# Patient Record
Sex: FEMALE
[De-identification: names, ages, dates, MRNs, and addresses within clinical notes are randomized per-mention and may not be internally consistent; named-entity substitution may affect disease eponyms.]

---

## 2017-06-22 ENCOUNTER — CHARTING TRANS (OUTPATIENT)
Dept: OTHER | Age: 52
End: 2017-06-22

## 2017-08-09 ENCOUNTER — CHARTING TRANS (OUTPATIENT)
Dept: OTHER | Age: 52
End: 2017-08-09

## 2018-01-08 ENCOUNTER — LAB SERVICES (OUTPATIENT)
Dept: OTHER | Age: 53
End: 2018-01-08

## 2018-01-08 ENCOUNTER — CHARTING TRANS (OUTPATIENT)
Dept: OTHER | Age: 53
End: 2018-01-08

## 2018-01-08 LAB
APPEARANCE: YELLOW
BILIRUBIN: NORMAL
GLUCOSE U: NORMAL
KETONES: NORMAL
LEUKOCYTES: NORMAL
NITRITE: NORMAL
OCCULT BLOOD: NORMAL
PH: 6
PROTEIN: NORMAL
URINE SPEC GRAVITY: 1.01
UROBILINOGEN: 0.2

## 2018-01-08 ASSESSMENT — PAIN SCALES - GENERAL: PAINLEVEL_OUTOF10: 9

## 2018-01-15 ENCOUNTER — HOSPITAL ENCOUNTER (OUTPATIENT)
Age: 53
Discharge: HOME | End: 2018-01-15
Payer: MEDICAID

## 2018-01-15 ENCOUNTER — APPOINTMENT (OUTPATIENT)
Dept: GENERAL RADIOLOGY | Age: 53
End: 2018-01-15
Attending: NURSE PRACTITIONER
Payer: MEDICAID

## 2018-01-15 VITALS
RESPIRATION RATE: 16 BRPM | SYSTOLIC BLOOD PRESSURE: 123 MMHG | DIASTOLIC BLOOD PRESSURE: 65 MMHG | HEART RATE: 74 BPM | TEMPERATURE: 99 F | OXYGEN SATURATION: 100 %

## 2018-01-15 DIAGNOSIS — S99.922A INJURY OF TOE ON LEFT FOOT, INITIAL ENCOUNTER: Primary | ICD-10-CM

## 2018-01-15 DIAGNOSIS — L85.3 DRY SKIN DERMATITIS: ICD-10-CM

## 2018-01-15 PROCEDURE — 73660 X-RAY EXAM OF TOE(S): CPT | Performed by: NURSE PRACTITIONER

## 2018-01-15 PROCEDURE — 99204 OFFICE O/P NEW MOD 45 MIN: CPT

## 2018-01-15 PROCEDURE — 99203 OFFICE O/P NEW LOW 30 MIN: CPT

## 2018-01-15 RX ORDER — LAMOTRIGINE 150 MG/1
300 TABLET ORAL 2 TIMES DAILY
COMMUNITY

## 2018-01-15 RX ORDER — TRIAMCINOLONE ACETONIDE 0.25 MG/G
1 CREAM TOPICAL 2 TIMES DAILY
Qty: 15 G | Refills: 0 | Status: SHIPPED | OUTPATIENT
Start: 2018-01-15 | End: 2018-05-02 | Stop reason: ALTCHOICE

## 2018-01-15 RX ORDER — PREGABALIN 50 MG/1
50 CAPSULE ORAL NIGHTLY
COMMUNITY
End: 2019-12-11

## 2018-01-15 RX ORDER — NITROFURANTOIN MACROCRYSTALS 100 MG/1
100 CAPSULE ORAL 2 TIMES DAILY
COMMUNITY
End: 2018-05-02

## 2018-01-15 NOTE — ED PROVIDER NOTES
Patient Seen in: [address] [facility]    History   Patient presents with:  Rash  Toe Injury    Stated Complaint: lump on her neck,left rib lump    68-year-old female presents today with complaints of rash to the face, neck and under the left b Constitutional: She is oriented to person, place, and time. She appears well-developed and well-nourished. HENT:   Head: Normocephalic.    Right Ear: Hearing and tympanic membrane normal.   Left Ear: Hearing and tympanic membrane normal.   Nose: Nose norm X-ray of toe shows no acute fracture. Patient instructed on claudette tape and rice instructions.   Patient given prescription for 2.5% of hydrocodone cream to help with eczema to the neck and left chest.  Patient also states has area over her eye that is dry
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
## 2018-03-10 ENCOUNTER — HOSPITAL ENCOUNTER (OUTPATIENT)
Age: 53
Discharge: HOME | End: 2018-03-10
Attending: FAMILY MEDICINE
Payer: MEDICAID

## 2018-03-10 VITALS
WEIGHT: 150 LBS | TEMPERATURE: 99 F | OXYGEN SATURATION: 100 % | RESPIRATION RATE: 16 BRPM | SYSTOLIC BLOOD PRESSURE: 125 MMHG | DIASTOLIC BLOOD PRESSURE: 63 MMHG | HEART RATE: 81 BPM

## 2018-03-10 DIAGNOSIS — H11.151 PINGUECULA OF RIGHT EYE: Primary | ICD-10-CM

## 2018-03-10 PROCEDURE — 99213 OFFICE O/P EST LOW 20 MIN: CPT

## 2018-03-10 RX ORDER — OLOPATADINE HYDROCHLORIDE 1 MG/ML
1 SOLUTION/ DROPS OPHTHALMIC 2 TIMES DAILY
Qty: 1 BOTTLE | Refills: 0 | Status: SHIPPED | OUTPATIENT
Start: 2018-03-10 | End: 2018-03-17

## 2018-03-10 NOTE — ED INITIAL ASSESSMENT (HPI)
Right eye has a red dot in the middle one week ago, right eye pain, no vision issues, tore a \"Mole,\" on her right upper abd, she just wants to have it looked at today.

## 2018-03-10 NOTE — ED PROVIDER NOTES
Patient Seen in: [address] [facility]    History   Patient presents with:   Eye Visual Problem (opthalmic)    Stated Complaint: right eye issue / tired / possible infection    HPI    43-year-old female presents to the immediate care today with 0902]  BP: 125/63  Pulse: 81  Resp: 16  Temp: 98.6 °F (37 °C)  Temp src: Temporal  SpO2: 100 %  O2 Device: None (Room air)    Current:/63   Pulse 81   Temp 98.6 °F (37 °C) (Temporal)   Resp 16   Wt 68 kg   SpO2 100%     Right Eye Chart Acuity: 20/40, Labs Reviewed - No data to display    ED Course as of Mar 10 0930  ------------------------------------------------------------       MDM     51-year-old female presenting with a pinguecula of the right eye. No signs of pterygium.   Recommend wearing sun

## 2018-05-02 ENCOUNTER — OFFICE VISIT (OUTPATIENT)
Dept: FAMILY MEDICINE CLINIC | Facility: CLINIC | Age: 53
End: 2018-05-02

## 2018-05-02 VITALS
OXYGEN SATURATION: 97 % | WEIGHT: 155 LBS | HEIGHT: 61.5 IN | BODY MASS INDEX: 28.89 KG/M2 | HEART RATE: 86 BPM | DIASTOLIC BLOOD PRESSURE: 72 MMHG | TEMPERATURE: 98 F | SYSTOLIC BLOOD PRESSURE: 98 MMHG | RESPIRATION RATE: 20 BRPM

## 2018-05-02 DIAGNOSIS — D22.9 NUMEROUS MOLES: Primary | ICD-10-CM

## 2018-05-02 DIAGNOSIS — L30.9 DERMATITIS: ICD-10-CM

## 2018-05-02 PROCEDURE — 99203 OFFICE O/P NEW LOW 30 MIN: CPT | Performed by: FAMILY MEDICINE

## 2018-05-02 RX ORDER — PIMECROLIMUS 10 MG/G
CREAM TOPICAL
Qty: 30 G | Refills: 0 | Status: SHIPPED | OUTPATIENT
Start: 2018-05-02 | End: 2018-07-25

## 2018-05-02 NOTE — PROGRESS NOTES
HPI:    Patient ID: Christianne Taylor is a 46year old female. Patient presents with:  Moles  Derm Problem    HPI   Here to estab care and get derm referral for mole check. Also here for rash. Eval in IC. Rash still there. Not spreading.   Rash on left s Numerous moles  (primary encounter diagnosis)  Dermatitis  D/c triamcino. Trial of elidel  Derm referral given  RTC as needed  No orders of the defined types were placed in this encounter.       Meds This Visit:  Signed Prescriptions Disp Refills    Keith

## 2018-07-25 ENCOUNTER — HOSPITAL ENCOUNTER (OUTPATIENT)
Age: 53
Discharge: HOME | End: 2018-07-25
Attending: FAMILY MEDICINE
Payer: MEDICAID

## 2018-07-25 ENCOUNTER — APPOINTMENT (OUTPATIENT)
Dept: ULTRASOUND IMAGING | Age: 53
End: 2018-07-25
Attending: FAMILY MEDICINE
Payer: MEDICAID

## 2018-07-25 ENCOUNTER — TELEPHONE (OUTPATIENT)
Dept: FAMILY MEDICINE CLINIC | Facility: CLINIC | Age: 53
End: 2018-07-25

## 2018-07-25 ENCOUNTER — APPOINTMENT (OUTPATIENT)
Dept: GENERAL RADIOLOGY | Age: 53
End: 2018-07-25
Attending: FAMILY MEDICINE
Payer: MEDICAID

## 2018-07-25 VITALS
DIASTOLIC BLOOD PRESSURE: 65 MMHG | HEIGHT: 62 IN | WEIGHT: 157.81 LBS | BODY MASS INDEX: 29.04 KG/M2 | HEART RATE: 65 BPM | RESPIRATION RATE: 16 BRPM | TEMPERATURE: 99 F | OXYGEN SATURATION: 100 % | SYSTOLIC BLOOD PRESSURE: 114 MMHG

## 2018-07-25 DIAGNOSIS — M25.561 ACUTE PAIN OF RIGHT KNEE: Primary | ICD-10-CM

## 2018-07-25 PROCEDURE — 93971 EXTREMITY STUDY: CPT | Performed by: FAMILY MEDICINE

## 2018-07-25 PROCEDURE — 99214 OFFICE O/P EST MOD 30 MIN: CPT

## 2018-07-25 PROCEDURE — 73560 X-RAY EXAM OF KNEE 1 OR 2: CPT | Performed by: FAMILY MEDICINE

## 2018-07-25 RX ORDER — NAPROXEN 500 MG/1
500 TABLET ORAL 2 TIMES DAILY PRN
Qty: 20 TABLET | Refills: 0 | Status: SHIPPED | OUTPATIENT
Start: 2018-07-25 | End: 2018-08-01

## 2018-07-25 NOTE — TELEPHONE ENCOUNTER
Pt called stated she has been having rt knee pain behind her knee and travels all the way down to the ankle. Pt stated it is swollen has been putting ice. Pt was wondering if she could be seen.

## 2018-07-25 NOTE — TELEPHONE ENCOUNTER
Location of pain: right knee, popliteal area, traveling down to ankle; shooting pain by ankle at night. Pain score:  8/10  Duration of pain:  Has been going on for about a week, thought stretching would work  Redness? No  Swelling?  Yes, lateral side of k

## 2018-07-25 NOTE — ED INITIAL ASSESSMENT (HPI)
x1 wk Pt c/o right knee pain that radiates into right calf/shin, +swelling of knee into ankle. +tingling, denies numbness, Pt denies any injury.     Eriberto knee 17 in  Left calf: 16in  Right calf 16.5in  eriberto ankle 10in

## 2018-07-25 NOTE — ED NOTES
Pt in room after Xray, pain 3/10, informed of wait times for US. Denies any needs at this time, positioned for comfort.

## 2018-07-26 NOTE — ED PROVIDER NOTES
Patient Seen in: [address] [facility]    History   Patient presents with:  Knee Pain  Leg Pain    Stated Complaint: right knee pain    HPI    51-year-old female presents for right knee and right leg pain.   Patient states she has intermittent r noted.   Neurological: She is alert and oriented to person, place, and time. Skin: Skin is warm and dry.      ED Course   Labs Reviewed - No data to display    ED Course as of Jul 25 2230  ------------------------------------------------------------

## 2018-07-31 ENCOUNTER — APPOINTMENT (OUTPATIENT)
Dept: GENERAL RADIOLOGY | Age: 53
End: 2018-07-31
Attending: EMERGENCY MEDICINE
Payer: MEDICAID

## 2018-07-31 ENCOUNTER — HOSPITAL ENCOUNTER (OUTPATIENT)
Age: 53
Discharge: HOME | End: 2018-07-31
Attending: EMERGENCY MEDICINE
Payer: MEDICAID

## 2018-07-31 VITALS
RESPIRATION RATE: 16 BRPM | SYSTOLIC BLOOD PRESSURE: 120 MMHG | OXYGEN SATURATION: 100 % | TEMPERATURE: 100 F | DIASTOLIC BLOOD PRESSURE: 83 MMHG | HEART RATE: 64 BPM

## 2018-07-31 DIAGNOSIS — M25.461 KNEE EFFUSION, RIGHT: ICD-10-CM

## 2018-07-31 DIAGNOSIS — S86.911A KNEE STRAIN, RIGHT, INITIAL ENCOUNTER: Primary | ICD-10-CM

## 2018-07-31 PROCEDURE — 99213 OFFICE O/P EST LOW 20 MIN: CPT

## 2018-07-31 PROCEDURE — 73560 X-RAY EXAM OF KNEE 1 OR 2: CPT | Performed by: EMERGENCY MEDICINE

## 2018-07-31 NOTE — ED PROVIDER NOTES
Patient presents with:  Lower Extremity Injury (musculoskeletal)    HPI:     Sumaya Licona is a 48year old female who presents with chief complaint of R knee pain. Was seen here on 7/25 for R knee and leg pain that had been ongoing for about 3 weeks. steps in her garage 2 days ago. She opened the door and fell backwards. Her right knee twisted sideways and she hit her head on the car. Patient has pain to anterior right knee. States she was unable to fully weight bear until this morning.   Swelling has

## 2018-07-31 NOTE — ED INITIAL ASSESSMENT (HPI)
Pt sts fell down 3 concrete steps in her garage on Sunday. Sts opened the door and fell backwards, right knee twisted sideways and hit head on car. No LOC, denies other injuries.   Aching/tightness pain to right lower thigh, knee, and upper lower leg with

## 2018-08-01 ENCOUNTER — OFFICE VISIT (OUTPATIENT)
Dept: FAMILY MEDICINE CLINIC | Facility: CLINIC | Age: 53
End: 2018-08-01
Payer: MEDICAID

## 2018-08-01 VITALS
TEMPERATURE: 98 F | BODY MASS INDEX: 29.82 KG/M2 | HEART RATE: 78 BPM | DIASTOLIC BLOOD PRESSURE: 60 MMHG | HEIGHT: 61.5 IN | RESPIRATION RATE: 16 BRPM | OXYGEN SATURATION: 98 % | WEIGHT: 160 LBS | SYSTOLIC BLOOD PRESSURE: 102 MMHG

## 2018-08-01 DIAGNOSIS — M25.461 EFFUSION OF RIGHT KNEE: ICD-10-CM

## 2018-08-01 DIAGNOSIS — M25.561 ACUTE PAIN OF RIGHT KNEE: Primary | ICD-10-CM

## 2018-08-01 PROCEDURE — 99214 OFFICE O/P EST MOD 30 MIN: CPT | Performed by: FAMILY MEDICINE

## 2018-08-01 NOTE — PROGRESS NOTES
HPI:    Patient ID: Kaela Brown is a 48year old female. Patient presents with:  Knee Pain    HPI  Here for follow up on right knee pain. Not much improvement. Seen in IC on 7/25 and 7/31  Location: Lateral side  Radiates to lateral side of calf.  Ritika Cadena %. ASSESSMENT/PLAN:   Acute pain of right knee  (primary encounter diagnosis)  Effusion of right knee  Pt requesting MRI knee. May need to do PT first. Both tests ordered. Rec knee brace for additional support. Cold packs.  Cont naproxen prn  Pt

## 2018-08-04 ENCOUNTER — OFFICE VISIT (OUTPATIENT)
Dept: FAMILY MEDICINE CLINIC | Facility: CLINIC | Age: 53
End: 2018-08-04
Payer: MEDICAID

## 2018-08-04 VITALS
SYSTOLIC BLOOD PRESSURE: 100 MMHG | RESPIRATION RATE: 16 BRPM | HEIGHT: 61.5 IN | TEMPERATURE: 98 F | HEART RATE: 89 BPM | OXYGEN SATURATION: 98 % | DIASTOLIC BLOOD PRESSURE: 68 MMHG | BODY MASS INDEX: 30.01 KG/M2 | WEIGHT: 161 LBS

## 2018-08-04 DIAGNOSIS — Z00.00 ANNUAL PHYSICAL EXAM: Primary | ICD-10-CM

## 2018-08-04 DIAGNOSIS — Z23 NEED FOR DIPHTHERIA-TETANUS-PERTUSSIS (TDAP) VACCINE: ICD-10-CM

## 2018-08-04 PROCEDURE — 90715 TDAP VACCINE 7 YRS/> IM: CPT | Performed by: FAMILY MEDICINE

## 2018-08-04 PROCEDURE — 90471 IMMUNIZATION ADMIN: CPT | Performed by: FAMILY MEDICINE

## 2018-08-04 PROCEDURE — 99396 PREV VISIT EST AGE 40-64: CPT | Performed by: FAMILY MEDICINE

## 2018-08-04 NOTE — PROGRESS NOTES
HPI:   Bharti Ayala is a 48year old female who presents for a complete physical exam.   UTD on colonoscopy  -MD in [place]. Will call back with name. Mammogram scheduled at [facility] for 9/8/18. Has gyne at [facility] -UTD on pap per pt.  Sees Dr. Obdulia Mejia denies headaches or dizziness  PSYCHE: denies depression or anxiety    EXAM:   /68   Pulse 89   Temp 98.3 °F (36.8 °C) (Temporal)   Resp 16   Ht 61.5\"   Wt 161 lb   SpO2 98%   BMI 29.93 kg/m²   Body mass index is 29.93 kg/m².    GENERAL:  well nouris

## 2018-08-06 ENCOUNTER — TELEPHONE (OUTPATIENT)
Dept: FAMILY MEDICINE CLINIC | Facility: CLINIC | Age: 53
End: 2018-08-06

## 2018-08-06 NOTE — TELEPHONE ENCOUNTER
Pt called was seen Sat and Dr. Venkat Chao wanted her to call back to let her know when her last pap was. Pt stated she has it 6/16/2017 with Dr. Constance Damian.

## 2018-08-07 ENCOUNTER — TELEPHONE (OUTPATIENT)
Dept: FAMILY MEDICINE CLINIC | Facility: CLINIC | Age: 53
End: 2018-08-07

## 2018-08-07 NOTE — TELEPHONE ENCOUNTER
Pt called stated Dr. Carla Moody wanted her to call and let her know when her last pap and last colonoscopy were.  Last pap was in June of last year by Dr. Mery Morin last colonoscopy was done last year Dr. Chikis Brenner phone number [phone]

## 2018-08-10 ENCOUNTER — NURSE ONLY (OUTPATIENT)
Dept: FAMILY MEDICINE CLINIC | Facility: CLINIC | Age: 53
End: 2018-08-10
Payer: MEDICAID

## 2018-08-10 DIAGNOSIS — Z00.00 GENERAL MEDICAL EXAM: Primary | ICD-10-CM

## 2018-08-10 DIAGNOSIS — Z00.00 ANNUAL PHYSICAL EXAM: ICD-10-CM

## 2018-08-10 LAB
ALBUMIN SERPL-MCNC: 3.7 G/DL (ref 3.5–4.8)
ALBUMIN/GLOB SERPL: 1 {RATIO} (ref 1–2)
ALP LIVER SERPL-CCNC: 65 U/L (ref 41–108)
ALT SERPL-CCNC: 37 U/L (ref 14–54)
ANION GAP SERPL CALC-SCNC: 4 MMOL/L (ref 0–18)
AST SERPL-CCNC: 28 U/L (ref 15–41)
BASOPHILS # BLD AUTO: 0.04 X10(3) UL (ref 0–0.1)
BASOPHILS NFR BLD AUTO: 1.3 %
BILIRUB SERPL-MCNC: 0.5 MG/DL (ref 0.1–2)
BUN BLD-MCNC: 15 MG/DL (ref 8–20)
BUN/CREAT SERPL: 13 (ref 10–20)
CALCIUM BLD-MCNC: 8.9 MG/DL (ref 8.3–10.3)
CHLORIDE SERPL-SCNC: 106 MMOL/L (ref 101–111)
CHOLEST SMN-MCNC: 247 MG/DL (ref ?–200)
CO2 SERPL-SCNC: 30 MMOL/L (ref 22–32)
CREAT BLD-MCNC: 1.15 MG/DL (ref 0.55–1.02)
EOSINOPHIL # BLD AUTO: 0.45 X10(3) UL (ref 0–0.3)
EOSINOPHIL NFR BLD AUTO: 14.3 %
ERYTHROCYTE [DISTWIDTH] IN BLOOD BY AUTOMATED COUNT: 13.2 % (ref 11.5–16)
EST. AVERAGE GLUCOSE BLD GHB EST-MCNC: 105 MG/DL (ref 68–126)
GLOBULIN PLAS-MCNC: 3.6 G/DL (ref 2.5–3.7)
GLUCOSE BLD-MCNC: 85 MG/DL (ref 70–99)
HBA1C MFR BLD HPLC: 5.3 % (ref ?–5.7)
HCT VFR BLD AUTO: 39.7 % (ref 34–50)
HDLC SERPL-MCNC: 94 MG/DL (ref 40–59)
HGB BLD-MCNC: 13.2 G/DL (ref 12–16)
IMMATURE GRANULOCYTE COUNT: 0.01 X10(3) UL (ref 0–1)
IMMATURE GRANULOCYTE RATIO %: 0.3 %
LDLC SERPL CALC-MCNC: 145 MG/DL (ref ?–100)
LYMPHOCYTES # BLD AUTO: 1.09 X10(3) UL (ref 0.9–4)
LYMPHOCYTES NFR BLD AUTO: 34.6 %
M PROTEIN MFR SERPL ELPH: 7.3 G/DL (ref 6.1–8.3)
MCH RBC QN AUTO: 31.9 PG (ref 27–33.2)
MCHC RBC AUTO-ENTMCNC: 33.2 G/DL (ref 31–37)
MCV RBC AUTO: 95.9 FL (ref 81–100)
MONOCYTES # BLD AUTO: 0.32 X10(3) UL (ref 0.1–1)
MONOCYTES NFR BLD AUTO: 10.2 %
NEUTROPHIL ABS PRELIM: 1.24 X10 (3) UL (ref 1.3–6.7)
NEUTROPHILS # BLD AUTO: 1.24 X10(3) UL (ref 1.3–6.7)
NEUTROPHILS NFR BLD AUTO: 39.3 %
NONHDLC SERPL-MCNC: 153 MG/DL (ref ?–130)
OSMOLALITY SERPL CALC.SUM OF ELEC: 290 MOSM/KG (ref 275–295)
PLATELET # BLD AUTO: 276 10(3)UL (ref 150–450)
POTASSIUM SERPL-SCNC: 4.4 MMOL/L (ref 3.6–5.1)
RBC # BLD AUTO: 4.14 X10(6)UL (ref 3.8–5.1)
RED CELL DISTRIBUTION WIDTH-SD: 46.7 FL (ref 35.1–46.3)
SODIUM SERPL-SCNC: 140 MMOL/L (ref 136–144)
TRIGL SERPL-MCNC: 41 MG/DL (ref 30–149)
TSI SER-ACNC: 3.34 MIU/ML (ref 0.35–5.5)
VLDLC SERPL CALC-MCNC: 8 MG/DL (ref 0–30)
WBC # BLD AUTO: 3.2 X10(3) UL (ref 4–13)

## 2018-08-10 PROCEDURE — 84443 ASSAY THYROID STIM HORMONE: CPT | Performed by: FAMILY MEDICINE

## 2018-08-10 PROCEDURE — 80061 LIPID PANEL: CPT | Performed by: FAMILY MEDICINE

## 2018-08-10 PROCEDURE — 83036 HEMOGLOBIN GLYCOSYLATED A1C: CPT | Performed by: FAMILY MEDICINE

## 2018-08-10 PROCEDURE — 80053 COMPREHEN METABOLIC PANEL: CPT | Performed by: FAMILY MEDICINE

## 2018-08-10 PROCEDURE — 85025 COMPLETE CBC W/AUTO DIFF WBC: CPT | Performed by: FAMILY MEDICINE

## 2018-08-10 PROCEDURE — 36415 COLL VENOUS BLD VENIPUNCTURE: CPT | Performed by: FAMILY MEDICINE

## 2018-08-15 ENCOUNTER — OFFICE VISIT (OUTPATIENT)
Dept: OBGYN CLINIC | Facility: CLINIC | Age: 53
End: 2018-08-15
Payer: MEDICAID

## 2018-08-15 VITALS
BODY MASS INDEX: 28.53 KG/M2 | WEIGHT: 161 LBS | HEART RATE: 70 BPM | DIASTOLIC BLOOD PRESSURE: 70 MMHG | SYSTOLIC BLOOD PRESSURE: 108 MMHG | HEIGHT: 63 IN

## 2018-08-15 DIAGNOSIS — R32 URINARY INCONTINENCE, UNSPECIFIED TYPE: ICD-10-CM

## 2018-08-15 DIAGNOSIS — N95.1 MENOPAUSAL VAGINAL DRYNESS: ICD-10-CM

## 2018-08-15 DIAGNOSIS — Z12.31 BREAST CANCER SCREENING BY MAMMOGRAM: ICD-10-CM

## 2018-08-15 DIAGNOSIS — Z87.898 HISTORY OF ABNORMAL MAMMOGRAM: ICD-10-CM

## 2018-08-15 DIAGNOSIS — Z01.419 ENCOUNTER FOR GYNECOLOGICAL EXAMINATION WITHOUT ABNORMAL FINDING: Primary | ICD-10-CM

## 2018-08-15 PROBLEM — E28.319 PREMATURE MENOPAUSE: Status: ACTIVE | Noted: 2018-08-15

## 2018-08-15 PROBLEM — G40.909 SEIZURE DISORDER (HCC): Status: ACTIVE | Noted: 2018-08-15

## 2018-08-15 PROCEDURE — 99203 OFFICE O/P NEW LOW 30 MIN: CPT | Performed by: OBSTETRICS & GYNECOLOGY

## 2018-08-15 RX ORDER — ESTRADIOL 10 UG/1
10 INSERT VAGINAL
Qty: 8 TABLET | Refills: 0 | Status: SHIPPED | OUTPATIENT
Start: 2018-08-16 | End: 2018-09-15

## 2018-08-15 RX ORDER — ESTRADIOL 10 UG/1
INSERT VAGINAL
Qty: 16 TABLET | Refills: 0 | Status: SHIPPED | OUTPATIENT
Start: 2018-08-15 | End: 2018-08-28

## 2018-08-15 NOTE — PROGRESS NOTES
New gynecology visit. 48year old G 2 P 1011. No LMP recorded. Patient is not currently having periods (Reason: Menopause). .     Here for establishment of gyn care and discussion of other issues. Last gyn exam with pap smear one year ago.  Went through me swelling, arthralgias. Neurological:  Epilepsy. /70   Pulse 70   Ht 63\"   Wt 161 lb   BMI 28.52 kg/m²     NECK:  Thyroid normal. No adenopathy. LUNGS:  Clear to auscultation. COR; Regular rate and rhythm.     BREASTS:  Symmetric

## 2018-08-25 ENCOUNTER — HOSPITAL ENCOUNTER (OUTPATIENT)
Dept: MAMMOGRAPHY | Facility: HOSPITAL | Age: 53
Discharge: HOME | End: 2018-08-25
Attending: OBSTETRICS & GYNECOLOGY
Payer: MEDICAID

## 2018-08-25 DIAGNOSIS — Z12.31 BREAST CANCER SCREENING BY MAMMOGRAM: ICD-10-CM

## 2018-08-25 DIAGNOSIS — Z87.898 HISTORY OF ABNORMAL MAMMOGRAM: ICD-10-CM

## 2018-08-25 PROCEDURE — 77063 BREAST TOMOSYNTHESIS BI: CPT | Performed by: OBSTETRICS & GYNECOLOGY

## 2018-08-25 PROCEDURE — 77067 SCR MAMMO BI INCL CAD: CPT | Performed by: OBSTETRICS & GYNECOLOGY

## 2018-08-28 ENCOUNTER — TELEPHONE (OUTPATIENT)
Dept: OBGYN CLINIC | Facility: CLINIC | Age: 53
End: 2018-08-28

## 2018-08-28 ENCOUNTER — OFFICE VISIT (OUTPATIENT)
Dept: OBGYN CLINIC | Facility: CLINIC | Age: 53
End: 2018-08-28
Payer: MEDICAID

## 2018-08-28 VITALS
SYSTOLIC BLOOD PRESSURE: 120 MMHG | BODY MASS INDEX: 23.05 KG/M2 | DIASTOLIC BLOOD PRESSURE: 78 MMHG | HEIGHT: 70 IN | WEIGHT: 161 LBS

## 2018-08-28 DIAGNOSIS — R35.0 URINARY FREQUENCY: Primary | ICD-10-CM

## 2018-08-28 DIAGNOSIS — N90.89 VULVAR LESION: ICD-10-CM

## 2018-08-28 LAB
MULTISTIX LOT#: NORMAL NUMERIC
PH, URINE: 5.5 (ref 4.5–8)
SPECIFIC GRAVITY: 1.01 (ref 1–1.03)
UROBILINOGEN,SEMI-QN: 0.2 MG/DL (ref 0–1.9)

## 2018-08-28 PROCEDURE — 99213 OFFICE O/P EST LOW 20 MIN: CPT | Performed by: NURSE PRACTITIONER

## 2018-08-28 PROCEDURE — 87086 URINE CULTURE/COLONY COUNT: CPT | Performed by: NURSE PRACTITIONER

## 2018-08-28 PROCEDURE — 81002 URINALYSIS NONAUTO W/O SCOPE: CPT | Performed by: NURSE PRACTITIONER

## 2018-08-28 PROCEDURE — 87529 HSV DNA AMP PROBE: CPT | Performed by: NURSE PRACTITIONER

## 2018-08-28 NOTE — TELEPHONE ENCOUNTER
48year old patient complaining of red, raised vaginal sore x one week; pt reports  seeing PCP for same issue.   Last OV date: 8/15/18  Recent Test/Labs: n/a   Recommendations Pt given appt today for exam.

## 2018-08-28 NOTE — PROGRESS NOTES
S:  Patient is here with a 1 week history of 2 painful vulvar bumps. They are tender to touch. They have not changed in character. Last night she also started having extreme urinary frequency. It doesn't burn with urination, she denies any urgency.  She gonzalez

## 2018-08-28 NOTE — TELEPHONE ENCOUNTER
Patient states that she has a rash in vaginal area. Yeast infection? UTI? She has to go to the bathroom frequently.  also has a rash that he is going to the doctor for today. Patient thinks she got it from her .   She has had it for about

## 2018-09-01 LAB
HSV 1 SUBTYPE BY PCR: DETECTED
HSV 2 SUBTYPE BY PCR: NOT DETECTED

## 2018-09-04 RX ORDER — VALACYCLOVIR HYDROCHLORIDE 500 MG/1
500 TABLET, FILM COATED ORAL 2 TIMES DAILY
Qty: 24 TABLET | Refills: 0 | Status: SHIPPED | OUTPATIENT
Start: 2018-09-04 | End: 2018-09-07

## 2018-09-06 ENCOUNTER — TELEPHONE (OUTPATIENT)
Dept: FAMILY MEDICINE CLINIC | Facility: CLINIC | Age: 53
End: 2018-09-06

## 2018-09-06 NOTE — TELEPHONE ENCOUNTER
Wanted you to be aware that insurance will not cover MRI of knee or PT of knee. She received letter from you stating that no appt for the order had been scheduled and wanted you to know why she hadn't scheduled.     Knee is still bothering her but not

## 2018-09-07 NOTE — TELEPHONE ENCOUNTER
Spoke with pt,  Pt states she knows that her MRI was denied by her insurance. She states she went to have her PT done at [facility] location and they told her that was denied too.   Pt notified that referral was authorized  by insurance and advise h

## 2018-10-16 ENCOUNTER — OFFICE VISIT (OUTPATIENT)
Dept: OBGYN CLINIC | Facility: CLINIC | Age: 53
End: 2018-10-16
Payer: MEDICAID

## 2018-10-16 VITALS
WEIGHT: 161 LBS | BODY MASS INDEX: 28.53 KG/M2 | SYSTOLIC BLOOD PRESSURE: 110 MMHG | HEIGHT: 63 IN | DIASTOLIC BLOOD PRESSURE: 70 MMHG

## 2018-10-16 DIAGNOSIS — N95.1 MENOPAUSAL VAGINAL DRYNESS: Primary | ICD-10-CM

## 2018-10-16 DIAGNOSIS — R35.0 URINARY FREQUENCY: ICD-10-CM

## 2018-10-16 PROCEDURE — 99212 OFFICE O/P EST SF 10 MIN: CPT | Performed by: OBSTETRICS & GYNECOLOGY

## 2018-10-16 RX ORDER — ESTRADIOL 10 UG/1
INSERT VAGINAL
Qty: 12 TABLET | Refills: 2 | Status: SHIPPED | OUTPATIENT
Start: 2018-10-16 | End: 2019-12-11

## 2018-10-16 NOTE — PROGRESS NOTES
Patient is a 48year old here for follow up treatment of postmenopausal vaginal dryness with associated dyspareunia and urinary frequency. Last seen in August for these issues. Prescribed Vagifem and only used for two weeks.  Symptoms improved and remain so

## 2018-11-02 VITALS
TEMPERATURE: 98.4 F | RESPIRATION RATE: 16 BRPM | HEART RATE: 80 BPM | SYSTOLIC BLOOD PRESSURE: 138 MMHG | DIASTOLIC BLOOD PRESSURE: 84 MMHG

## 2018-11-03 VITALS
DIASTOLIC BLOOD PRESSURE: 70 MMHG | WEIGHT: 162.38 LBS | HEART RATE: 70 BPM | BODY MASS INDEX: 29.88 KG/M2 | HEIGHT: 62 IN | SYSTOLIC BLOOD PRESSURE: 108 MMHG

## 2019-01-12 ENCOUNTER — HOSPITAL ENCOUNTER (OUTPATIENT)
Dept: GENERAL RADIOLOGY | Age: 54
Discharge: HOME | End: 2019-01-12
Attending: FAMILY MEDICINE
Payer: MEDICAID

## 2019-01-12 ENCOUNTER — OFFICE VISIT (OUTPATIENT)
Dept: FAMILY MEDICINE CLINIC | Facility: CLINIC | Age: 54
End: 2019-01-12
Payer: MEDICAID

## 2019-01-12 VITALS
OXYGEN SATURATION: 97 % | TEMPERATURE: 98 F | WEIGHT: 155 LBS | RESPIRATION RATE: 20 BRPM | DIASTOLIC BLOOD PRESSURE: 62 MMHG | BODY MASS INDEX: 27 KG/M2 | HEART RATE: 77 BPM | SYSTOLIC BLOOD PRESSURE: 112 MMHG

## 2019-01-12 DIAGNOSIS — M25.571 CHRONIC PAIN OF RIGHT ANKLE: ICD-10-CM

## 2019-01-12 DIAGNOSIS — M72.2 PLANTAR FASCIITIS: Primary | ICD-10-CM

## 2019-01-12 DIAGNOSIS — M25.572 ACUTE LEFT ANKLE PAIN: ICD-10-CM

## 2019-01-12 DIAGNOSIS — M72.2 PLANTAR FASCIITIS: ICD-10-CM

## 2019-01-12 DIAGNOSIS — G89.29 CHRONIC PAIN OF RIGHT ANKLE: ICD-10-CM

## 2019-01-12 LAB
ALBUMIN SERPL-MCNC: 3.9 G/DL (ref 3.1–4.5)
ALBUMIN/GLOB SERPL: 1.1 {RATIO} (ref 1–2)
ALP LIVER SERPL-CCNC: 71 U/L (ref 41–108)
ALT SERPL-CCNC: 41 U/L (ref 14–54)
ANION GAP SERPL CALC-SCNC: 6 MMOL/L (ref 0–18)
AST SERPL-CCNC: 26 U/L (ref 15–41)
BILIRUB SERPL-MCNC: 0.2 MG/DL (ref 0.1–2)
BUN BLD-MCNC: 14 MG/DL (ref 8–20)
BUN/CREAT SERPL: 14.6 (ref 10–20)
CALCIUM BLD-MCNC: 9.1 MG/DL (ref 8.3–10.3)
CHLORIDE SERPL-SCNC: 106 MMOL/L (ref 101–111)
CO2 SERPL-SCNC: 30 MMOL/L (ref 22–32)
CREAT BLD-MCNC: 0.96 MG/DL (ref 0.55–1.02)
EST. AVERAGE GLUCOSE BLD GHB EST-MCNC: 111 MG/DL (ref 68–126)
GLOBULIN PLAS-MCNC: 3.7 G/DL (ref 2.8–4.4)
GLUCOSE BLD-MCNC: 95 MG/DL (ref 70–99)
HBA1C MFR BLD HPLC: 5.5 % (ref ?–5.7)
M PROTEIN MFR SERPL ELPH: 7.6 G/DL (ref 6.4–8.2)
OSMOLALITY SERPL CALC.SUM OF ELEC: 294 MOSM/KG (ref 275–295)
POTASSIUM SERPL-SCNC: 4.3 MMOL/L (ref 3.6–5.1)
SODIUM SERPL-SCNC: 142 MMOL/L (ref 136–144)
URATE SERPL-MCNC: 4.9 MG/DL (ref 2.4–8)

## 2019-01-12 PROCEDURE — 99214 OFFICE O/P EST MOD 30 MIN: CPT | Performed by: FAMILY MEDICINE

## 2019-01-12 PROCEDURE — 36415 COLL VENOUS BLD VENIPUNCTURE: CPT | Performed by: FAMILY MEDICINE

## 2019-01-12 PROCEDURE — 73610 X-RAY EXAM OF ANKLE: CPT | Performed by: FAMILY MEDICINE

## 2019-01-12 PROCEDURE — 83036 HEMOGLOBIN GLYCOSYLATED A1C: CPT | Performed by: FAMILY MEDICINE

## 2019-01-12 PROCEDURE — 84550 ASSAY OF BLOOD/URIC ACID: CPT | Performed by: FAMILY MEDICINE

## 2019-01-12 PROCEDURE — 80053 COMPREHEN METABOLIC PANEL: CPT | Performed by: FAMILY MEDICINE

## 2019-01-12 NOTE — PROGRESS NOTES
HPI:    Patient ID: Marcel Martinez is a 48year old female. Patient presents with:  Swollen Feet    HPI   Pain at bottom of both feet. Right worse then left. Sharp pain in AM with few steps in AM. Better after walking for 10 minutes.   Onset: 4 weeks reviewed. Blood pressure 112/62, pulse 77, temperature 98.3 °F (36.8 °C), temperature source Temporal, resp. rate 20, weight 155 lb, SpO2 97 %.              ASSESSMENT/PLAN:   Plantar fasciitis  (primary encounter diagnosis)  Acute left ankle pain  Chron

## 2019-01-15 ENCOUNTER — TELEPHONE (OUTPATIENT)
Dept: FAMILY MEDICINE CLINIC | Facility: CLINIC | Age: 54
End: 2019-01-15

## 2019-01-15 NOTE — TELEPHONE ENCOUNTER
Informed patient results will be available tomorrow.     CMP, A1C, uric acid results released to patient - WNL

## 2019-01-15 NOTE — TELEPHONE ENCOUNTER
Pt called [facility] stated she had a test done Friday did not state what kind of test but she would like to know the results.

## 2019-02-05 ENCOUNTER — TELEPHONE (OUTPATIENT)
Dept: FAMILY MEDICINE CLINIC | Facility: CLINIC | Age: 54
End: 2019-02-05

## 2019-02-05 NOTE — TELEPHONE ENCOUNTER
Symptoms started yesterday  Vaginal area red and irritated  Vaginal area itchy  No discharge  Denies foul odor  Painful urination  Urinary frequency  No fever  Denies abdominal pain  Denies nausea/vomiting  Denies flank pain  Offered 12:00 appointment evelina

## 2019-02-09 ENCOUNTER — OFFICE VISIT (OUTPATIENT)
Dept: FAMILY MEDICINE CLINIC | Facility: CLINIC | Age: 54
End: 2019-02-09
Payer: MEDICAID

## 2019-02-09 VITALS
HEART RATE: 80 BPM | TEMPERATURE: 98 F | SYSTOLIC BLOOD PRESSURE: 102 MMHG | HEIGHT: 63 IN | OXYGEN SATURATION: 99 % | BODY MASS INDEX: 27.64 KG/M2 | DIASTOLIC BLOOD PRESSURE: 64 MMHG | RESPIRATION RATE: 20 BRPM | WEIGHT: 156 LBS

## 2019-02-09 DIAGNOSIS — N89.8 VAGINAL IRRITATION: Primary | ICD-10-CM

## 2019-02-09 LAB
APPEARANCE: CLEAR
MULTISTIX LOT#: NORMAL NUMERIC
PH, URINE: 7 (ref 4.5–8)
SPECIFIC GRAVITY: 1.02 (ref 1–1.03)
URINE-COLOR: YELLOW
UROBILINOGEN,SEMI-QN: 0.2 MG/DL (ref 0–1.9)

## 2019-02-09 PROCEDURE — 87660 TRICHOMONAS VAGIN DIR PROBE: CPT | Performed by: FAMILY MEDICINE

## 2019-02-09 PROCEDURE — 87480 CANDIDA DNA DIR PROBE: CPT | Performed by: FAMILY MEDICINE

## 2019-02-09 PROCEDURE — 87510 GARDNER VAG DNA DIR PROBE: CPT | Performed by: FAMILY MEDICINE

## 2019-02-09 PROCEDURE — 87086 URINE CULTURE/COLONY COUNT: CPT | Performed by: FAMILY MEDICINE

## 2019-02-09 PROCEDURE — 99213 OFFICE O/P EST LOW 20 MIN: CPT | Performed by: FAMILY MEDICINE

## 2019-02-09 PROCEDURE — 81003 URINALYSIS AUTO W/O SCOPE: CPT | Performed by: FAMILY MEDICINE

## 2019-02-09 NOTE — PROGRESS NOTES
HPI:    Patient ID: Tomi Echevarria is a 48year old female. Patient presents with:  Yeast Infection    HPI  Vaginal itching for 1 week. Feels \"irritated\"  Also c/o dysuria. No vaginal discharge. No urinary freq, urgency or hematuria.   Pt concerned Imaging & Referrals:  None       #[id number]

## 2019-03-09 ENCOUNTER — TELEPHONE (OUTPATIENT)
Dept: FAMILY MEDICINE CLINIC | Facility: CLINIC | Age: 54
End: 2019-03-09

## 2019-03-09 NOTE — TELEPHONE ENCOUNTER
Referral already placed by Dr. Leeann Perea office  Patient has referral information from their office. She will call to set this up.

## 2019-03-09 NOTE — TELEPHONE ENCOUNTER
Needs referral to get PT. For her foot, per her foot Doctor Edwin Servin. Please call patient when referral placed.

## 2019-03-11 ENCOUNTER — LAB ENCOUNTER (OUTPATIENT)
Dept: LAB | Age: 54
End: 2019-03-11
Attending: Other
Payer: MEDICAID

## 2019-03-11 DIAGNOSIS — E78.5 DYSLIPIDEMIA: Primary | ICD-10-CM

## 2019-03-11 LAB
CHOLEST SMN-MCNC: 220 MG/DL (ref ?–200)
HDLC SERPL-MCNC: 77 MG/DL (ref 40–59)
LDLC SERPL CALC-MCNC: 127 MG/DL (ref ?–100)
NONHDLC SERPL-MCNC: 143 MG/DL (ref ?–130)
TRIGL SERPL-MCNC: 79 MG/DL (ref 30–149)
VLDLC SERPL CALC-MCNC: 16 MG/DL (ref 0–30)

## 2019-03-11 PROCEDURE — 80061 LIPID PANEL: CPT

## 2019-03-19 ENCOUNTER — TELEPHONE (OUTPATIENT)
Dept: FAMILY MEDICINE CLINIC | Facility: CLINIC | Age: 54
End: 2019-03-19

## 2019-03-19 NOTE — TELEPHONE ENCOUNTER
Patient looking for lipid results from 3/11/19  Panel ordered by different provider. Patient is going to call ordering provider's office for results.

## 2019-03-29 ENCOUNTER — APPOINTMENT (OUTPATIENT)
Dept: PHYSICAL THERAPY | Age: 54
End: 2019-03-29
Attending: PODIATRIST
Payer: MEDICAID

## 2019-04-01 ENCOUNTER — APPOINTMENT (OUTPATIENT)
Dept: PHYSICAL THERAPY | Age: 54
End: 2019-04-01
Attending: PODIATRIST
Payer: MEDICAID

## 2019-04-03 ENCOUNTER — OFFICE VISIT (OUTPATIENT)
Dept: PHYSICAL THERAPY | Age: 54
End: 2019-04-03
Attending: PODIATRIST
Payer: MEDICAID

## 2019-04-03 PROCEDURE — 97140 MANUAL THERAPY 1/> REGIONS: CPT | Performed by: PHYSICAL THERAPIST

## 2019-04-03 PROCEDURE — 97110 THERAPEUTIC EXERCISES: CPT | Performed by: PHYSICAL THERAPIST

## 2019-04-03 PROCEDURE — 97161 PT EVAL LOW COMPLEX 20 MIN: CPT | Performed by: PHYSICAL THERAPIST

## 2019-04-03 NOTE — PROGRESS NOTES
LOWER EXTREMITY EVALUATION:   Referring Physician: Dr. Radha Stevenson  Diagnosis: Ankle tendonitis B LE     Date of Service: 4/3/2019     PATIENT Donya Sultana is a 48year old y/o female who presents to therapy today with complaints of B foot p with pes cavus in B feet with no current arch support. PT edu pt on proper arch support and kinematics of the foot with not having support. Pt reports understanding. Pt reports that she has been walking different as a result of pain.   PT edu pt that she DF: R 0; L -12 from neutral  PF: R 35; L 35  INV: R 35; L 28  EV: R 10; L 8       PROM:   Foot/Ankle   DF: R 3; L 0, neutral  PF: R 35; L WFL  INV: R 35; L 35  EV: R 15; L 15         Flexibility:  Hip Flexor: R max, L max  Hamstrings: R max; L maax  Piri Patient will be seen for 1-3 x/week or a total of 10 visits over a 90 day period. Treatment will include: Manual Therapy; Therapeutic Exercises; Neuromuscular Re-education;  Therapeutic Activity; Gait Training; Electrical Stim; Pt education; Home exercise p

## 2019-04-08 ENCOUNTER — OFFICE VISIT (OUTPATIENT)
Dept: PHYSICAL THERAPY | Age: 54
End: 2019-04-08
Attending: PODIATRIST
Payer: MEDICAID

## 2019-04-08 PROCEDURE — 97140 MANUAL THERAPY 1/> REGIONS: CPT | Performed by: PHYSICAL THERAPIST

## 2019-04-08 PROCEDURE — 97110 THERAPEUTIC EXERCISES: CPT | Performed by: PHYSICAL THERAPIST

## 2019-04-08 NOTE — PROGRESS NOTES
Dx: Ankle tendonitis B LE         Authorized # of Visits:  6         Next MD visit: none scheduled  Fall Risk: Moderate        Precautions: history of epilepsy             Subjective:   Pt reports no change in pain since IE.   Pt went to gym and exercised h with ADLs such as prolonged gait and stair negotiation (10 visits)  · Pt will have improved SLS to >20s for increased ankle stability with ambulation on uneven surfaces such as gravel and grass (10 visits)  · Pt will report <0-2/10 pain with work and home

## 2019-04-10 ENCOUNTER — OFFICE VISIT (OUTPATIENT)
Dept: PHYSICAL THERAPY | Age: 54
End: 2019-04-10
Attending: PODIATRIST
Payer: MEDICAID

## 2019-04-10 PROCEDURE — 97014 ELECTRIC STIMULATION THERAPY: CPT | Performed by: PHYSICAL THERAPIST

## 2019-04-10 PROCEDURE — 97140 MANUAL THERAPY 1/> REGIONS: CPT | Performed by: PHYSICAL THERAPIST

## 2019-04-10 PROCEDURE — 97110 THERAPEUTIC EXERCISES: CPT | Performed by: PHYSICAL THERAPIST

## 2019-04-10 NOTE — PROGRESS NOTES
Dx: Ankle tendonitis B LE         Authorized # of Visits:  6         Next MD visit: none scheduled  Fall Risk: Moderate        Precautions: history of epilepsy             Subjective:   Pt reports that cont with pain in morning and at night and cont with t stair negotiation (10 visits)  · Pt will have improved SLS to >20s for increased ankle stability with ambulation on uneven surfaces such as gravel and grass (10 visits)  · Pt will report <0-2/10 pain with work and home activities such as amb, standing, ret

## 2019-04-17 ENCOUNTER — OFFICE VISIT (OUTPATIENT)
Dept: PHYSICAL THERAPY | Age: 54
End: 2019-04-17
Attending: PODIATRIST
Payer: MEDICAID

## 2019-04-17 PROCEDURE — 97140 MANUAL THERAPY 1/> REGIONS: CPT | Performed by: PHYSICAL THERAPIST

## 2019-04-17 PROCEDURE — 97110 THERAPEUTIC EXERCISES: CPT | Performed by: PHYSICAL THERAPIST

## 2019-04-17 PROCEDURE — 97014 ELECTRIC STIMULATION THERAPY: CPT | Performed by: PHYSICAL THERAPIST

## 2019-04-17 NOTE — PROGRESS NOTES
Dx: Ankle tendonitis B LE         Authorized # of Visits:  6         Next MD visit: none scheduled  Fall Risk: Moderate        Precautions: history of epilepsy             Subjective:   Pt reports that she cont to take a tylenol at night and has more pain and compliant with comprehensive HEP to maintain progress achieved in PT (10 visits)    Plan:   PT to increase therex as pt tolerates: ankle 4 way with TB  Date: 4/8/2019   TX#: 2/6 Date:4/10/19               TX#: 3/6   Date: 4/17/19              TX#: 4/6

## 2019-04-19 ENCOUNTER — APPOINTMENT (OUTPATIENT)
Dept: PHYSICAL THERAPY | Age: 54
End: 2019-04-19
Attending: PODIATRIST
Payer: MEDICAID

## 2019-04-24 ENCOUNTER — OFFICE VISIT (OUTPATIENT)
Dept: PHYSICAL THERAPY | Age: 54
End: 2019-04-24
Attending: PODIATRIST
Payer: MEDICAID

## 2019-04-24 PROCEDURE — 97110 THERAPEUTIC EXERCISES: CPT | Performed by: PHYSICAL THERAPIST

## 2019-04-24 PROCEDURE — 97140 MANUAL THERAPY 1/> REGIONS: CPT | Performed by: PHYSICAL THERAPIST

## 2019-04-24 PROCEDURE — 97014 ELECTRIC STIMULATION THERAPY: CPT | Performed by: PHYSICAL THERAPIST

## 2019-04-24 NOTE — PROGRESS NOTES
Dx: Ankle tendonitis B LE         Authorized # of Visits:  6         Next MD visit: none scheduled  Fall Risk: Moderate        Precautions: history of epilepsy             Subjective:   Pt reports that she feels as though she turned up the TENS unit a tran as gravel and grass (10 visits)  · Pt will report <0-2/10 pain with work and home activities such as amb, standing, return to gym exercises (10 visits)  · Pt will be independent and compliant with comprehensive HEP to maintain progress achieved in PT (10 v

## 2019-04-26 ENCOUNTER — OFFICE VISIT (OUTPATIENT)
Dept: PHYSICAL THERAPY | Age: 54
End: 2019-04-26
Attending: PODIATRIST
Payer: MEDICAID

## 2019-04-26 PROCEDURE — 97014 ELECTRIC STIMULATION THERAPY: CPT | Performed by: PHYSICAL THERAPIST

## 2019-04-26 PROCEDURE — 97110 THERAPEUTIC EXERCISES: CPT | Performed by: PHYSICAL THERAPIST

## 2019-04-26 PROCEDURE — 97140 MANUAL THERAPY 1/> REGIONS: CPT | Performed by: PHYSICAL THERAPIST

## 2019-04-26 NOTE — PROGRESS NOTES
Discharge Summary    Pt has attended 6, cancelled 0, and no shown 0 visits in Physical Therapy. Subjective:   Pt that she is approx 50% better since [facility] and would like to see if she can cont with to progress on her own.   Pt reports that she wants to get progressing  · Pt will have improved SLS to >20s for increased ankle stability with ambulation on uneven surfaces such as gravel and grass (10 visits).  Met on R and cont on L  · Pt will report <0-2/10 pain with work and home activities such as amb, standin foam x 30 sec ea X 30 sec ea no foam   Toe curls x 1 min ea X 2 mins DC - -   Calf raises - 2 x 10  3 way calf raises x 15 ea 3 way calf raises x 15 ea   Tandem balance - - X 30 sec ea on foam 3 cones tap x 5   rhomberg balance - - Eyes open and closed x 3

## 2019-05-08 ENCOUNTER — APPOINTMENT (OUTPATIENT)
Dept: PHYSICAL THERAPY | Age: 54
End: 2019-05-08
Attending: FAMILY MEDICINE
Payer: MEDICAID

## 2019-05-10 ENCOUNTER — APPOINTMENT (OUTPATIENT)
Dept: PHYSICAL THERAPY | Age: 54
End: 2019-05-10
Payer: MEDICAID

## 2019-10-29 ENCOUNTER — TELEPHONE (OUTPATIENT)
Dept: FAMILY MEDICINE CLINIC | Facility: CLINIC | Age: 54
End: 2019-10-29

## 2019-10-29 DIAGNOSIS — E78.5 HYPERLIPIDEMIA, UNSPECIFIED HYPERLIPIDEMIA TYPE: Primary | ICD-10-CM

## 2019-10-29 NOTE — TELEPHONE ENCOUNTER
Pt wants to get her cholesteral checked,  Pt states she does not have medical insurance, does pt need OV with dr or just NV?  last OV 2/9/19.   Please advise

## 2019-10-29 NOTE — TELEPHONE ENCOUNTER
Patient states she just wants her cholesterol checked. Doesn't have insurance. Last OV was 2/9/19. Last physical 8/4/18. Patient wants to know if she needs OV or nurse visit? Advise. Thanks!

## 2019-10-30 ENCOUNTER — NURSE ONLY (OUTPATIENT)
Dept: FAMILY MEDICINE CLINIC | Facility: CLINIC | Age: 54
End: 2019-10-30

## 2019-10-30 DIAGNOSIS — E78.5 HYPERLIPIDEMIA, UNSPECIFIED HYPERLIPIDEMIA TYPE: ICD-10-CM

## 2019-10-30 PROCEDURE — 80061 LIPID PANEL: CPT | Performed by: FAMILY MEDICINE

## 2019-10-30 NOTE — TELEPHONE ENCOUNTER
Okay to check lipid panel with RN. She can come here or go to [facility].  Dx: hyperlipidemia  Rec she schedule CPx with me once she has insurance.

## 2019-10-30 NOTE — PROGRESS NOTES
Walton Leventhal presents today for nurse visit. Labs ordered by Dr Yvan Roche. 1 light green drawn from right ac area with 1 attempt with straight needle. Patient tolerated well. Left office in stable condition.

## 2019-10-30 NOTE — TELEPHONE ENCOUNTER
Patient advised. Verbalized understanding. Order placed. Patient requested appt today with nurse. Advised patient that this needs to be a 12 hour fast. Verbalized understanding.     Future Appointments   Date Time Provider Héctor Gordon   10/30/2019  2:

## 2019-10-31 ENCOUNTER — TELEPHONE (OUTPATIENT)
Dept: FAMILY MEDICINE CLINIC | Facility: CLINIC | Age: 54
End: 2019-10-31

## 2019-10-31 NOTE — TELEPHONE ENCOUNTER
----- Message from Sarahi Grimes MD sent at 10/30/2019 11:45 PM CDT -----  LDL and HDL improved.   Cont low fat diet

## 2019-11-05 ENCOUNTER — LAB ENCOUNTER (OUTPATIENT)
Dept: LAB | Age: 54
End: 2019-11-05
Attending: Other

## 2019-11-05 DIAGNOSIS — R56.9 SEIZURES (HCC): Primary | ICD-10-CM

## 2019-11-05 PROCEDURE — 80366 DRUG SCREENING PREGABALIN: CPT

## 2019-11-05 PROCEDURE — 36415 COLL VENOUS BLD VENIPUNCTURE: CPT

## 2019-11-05 PROCEDURE — 80175 DRUG SCREEN QUAN LAMOTRIGINE: CPT

## 2019-12-11 ENCOUNTER — OFFICE VISIT (OUTPATIENT)
Dept: FAMILY MEDICINE CLINIC | Facility: CLINIC | Age: 54
End: 2019-12-11

## 2019-12-11 VITALS
OXYGEN SATURATION: 97 % | HEART RATE: 76 BPM | WEIGHT: 161 LBS | BODY MASS INDEX: 28.53 KG/M2 | TEMPERATURE: 98 F | DIASTOLIC BLOOD PRESSURE: 64 MMHG | RESPIRATION RATE: 16 BRPM | HEIGHT: 63 IN | SYSTOLIC BLOOD PRESSURE: 106 MMHG

## 2019-12-11 DIAGNOSIS — L30.9 DERMATITIS: Primary | ICD-10-CM

## 2019-12-11 PROCEDURE — 99213 OFFICE O/P EST LOW 20 MIN: CPT | Performed by: FAMILY MEDICINE

## 2019-12-11 RX ORDER — PREGABALIN 50 MG/1
50 CAPSULE ORAL 3 TIMES DAILY
COMMUNITY

## 2019-12-11 RX ORDER — TRIAMCINOLONE ACETONIDE 0.25 MG/G
1 CREAM TOPICAL 2 TIMES DAILY
Qty: 15 G | Refills: 0 | Status: SHIPPED | OUTPATIENT
Start: 2019-12-11 | End: 2020-10-30

## 2019-12-11 RX ORDER — ATORVASTATIN CALCIUM 20 MG/1
TABLET, FILM COATED ORAL
Refills: 3 | COMMUNITY
Start: 2019-10-01 | End: 2020-03-12

## 2019-12-11 NOTE — PROGRESS NOTES
Patient presents with:  Itchiness       HPI:    Patient ID: Erika Vasques is a 47year old female. Here for flare of known dermatitis. B/l upper eyelids. Itchy. Triamcinolone effective. Needs refill. Recurring past few weeks.  Trigger: cold weather Neck supple. Cardiovascular: Normal rate. Pulmonary/Chest: Effort normal and breath sounds normal.   Lymphadenopathy:     She has no cervical adenopathy.    Skin:   Dry macular scaly patch under b/l brows, extends to  upper eyelid on right            A

## 2020-01-22 ENCOUNTER — OFFICE VISIT (OUTPATIENT)
Dept: FAMILY MEDICINE CLINIC | Facility: CLINIC | Age: 55
End: 2020-01-22

## 2020-01-22 VITALS
HEART RATE: 78 BPM | WEIGHT: 161.63 LBS | DIASTOLIC BLOOD PRESSURE: 70 MMHG | SYSTOLIC BLOOD PRESSURE: 100 MMHG | OXYGEN SATURATION: 98 % | RESPIRATION RATE: 16 BRPM | TEMPERATURE: 98 F | BODY MASS INDEX: 29 KG/M2

## 2020-01-22 DIAGNOSIS — L02.92 BOILS: Primary | ICD-10-CM

## 2020-01-22 PROCEDURE — 99213 OFFICE O/P EST LOW 20 MIN: CPT | Performed by: FAMILY MEDICINE

## 2020-01-22 RX ORDER — SULFAMETHOXAZOLE AND TRIMETHOPRIM 800; 160 MG/1; MG/1
1 TABLET ORAL 2 TIMES DAILY
Qty: 20 TABLET | Refills: 0 | Status: SHIPPED | OUTPATIENT
Start: 2020-01-22 | End: 2020-02-01

## 2020-01-22 NOTE — PROGRESS NOTES
Patient presents with:  Rash: symptoms for about 1 year. located on L arm. HPI:    Patient ID: Cathie Joseph is a 47year old female.     HPI   Concern about mrsa  father has mras and she works with people with mrsa in past   She reports that she noted.             ASSESSMENT/PLAN:   1. Boils  New problem  Treat with antibiotics and it will also clear any mrsa infection.  - Sulfamethoxazole-TMP -160 MG Oral Tab per tablet; Take 1 tablet by mouth 2 (two) times daily for 10 days.   Dispense: 20

## 2020-03-12 ENCOUNTER — OFFICE VISIT (OUTPATIENT)
Dept: FAMILY MEDICINE CLINIC | Facility: CLINIC | Age: 55
End: 2020-03-12

## 2020-03-12 VITALS
SYSTOLIC BLOOD PRESSURE: 110 MMHG | OXYGEN SATURATION: 98 % | TEMPERATURE: 99 F | RESPIRATION RATE: 18 BRPM | HEART RATE: 78 BPM | DIASTOLIC BLOOD PRESSURE: 78 MMHG | BODY MASS INDEX: 29 KG/M2 | WEIGHT: 161 LBS

## 2020-03-12 DIAGNOSIS — M62.838 MUSCLE SPASM: Primary | ICD-10-CM

## 2020-03-12 PROCEDURE — 99213 OFFICE O/P EST LOW 20 MIN: CPT | Performed by: FAMILY MEDICINE

## 2020-03-12 RX ORDER — CYCLOBENZAPRINE HCL 10 MG
10 TABLET ORAL 2 TIMES DAILY
Qty: 20 TABLET | Refills: 0 | Status: SHIPPED | OUTPATIENT
Start: 2020-03-12 | End: 2020-03-22

## 2020-03-12 NOTE — PATIENT INSTRUCTIONS
Cyclobenzaprine tablets  Brand Names: Fexmid, Flexeril  What is this medicine? CYCLOBENZAPRINE (sye kloe MONICA za preen) is a muscle relaxer. It is used to treat muscle pain, spasms, and stiffness. How should I use this medicine?   Take this medicine by filiberto · certain medicines for infection like alfuzosin, chloroquine, clarithromycin, levofloxacin, mefloquine, pentamidine, troleandomycin  · certain medicines for irregular heart beat  · certain medicines for seizures like phenobarbital, primidone  · contrast d You may get drowsy or dizzy. Do not drive, use machinery, or do anything that needs mental alertness until you know how this medicine affects you. Do not stand or sit up quickly, especially if you are an older patient.  This reduces the risk of dizzy or mono

## 2020-03-12 NOTE — PROGRESS NOTES
Patient presents with:  Muscle Pain       HPI:    Patient ID: Vicki Burns is a 47year old female. HPI Patient is here for back pain. She was moving furniture and opening boxes on Monday and felt pain on left lower back.  Next day she had pain on ri Social History: Social History    Tobacco Use      Smoking status: Never Smoker      Smokeless tobacco: Never Used    Alcohol use: Yes      Comment: occasional    Drug use: No       PHYSICAL EXAM:    /78 (BP Location: Left arm, Patient Position:

## 2020-04-06 ENCOUNTER — TELEPHONE (OUTPATIENT)
Dept: FAMILY MEDICINE CLINIC | Facility: CLINIC | Age: 55
End: 2020-04-06

## 2020-04-06 RX ORDER — CYCLOBENZAPRINE HCL 10 MG
10 TABLET ORAL 2 TIMES DAILY PRN
Qty: 30 TABLET | Refills: 0 | Status: SHIPPED | OUTPATIENT
Start: 2020-04-06 | End: 2020-04-26

## 2020-04-06 NOTE — TELEPHONE ENCOUNTER
Patient pulled a muscle in her back about 2 weeks ago - was seen 3/12/20  Patient was given flexeril  Patient states her symptoms improved but she did some yard work and back pain returned  Patient has complaints of low back pain radiating into the left le

## 2020-04-06 NOTE — TELEPHONE ENCOUNTER
Patient pulled a muscle in her back and Dr Brown Running gave her a muscle relaxer (cyclobenzaprine 10 MG Oral Tab ) which did help a little bit. Pain is worse and has travelled down her left leg. Patient would like a muscle relaxer again and something for pain.

## 2020-04-08 RX ORDER — METHYLPREDNISOLONE 4 MG/1
TABLET ORAL
Qty: 1 KIT | Refills: 0 | Status: SHIPPED | OUTPATIENT
Start: 2020-04-08 | End: 2021-05-04

## 2020-04-08 NOTE — TELEPHONE ENCOUNTER
Pt states has has been taking muscle relaxer for 3 days and is not helping. Can this dosage be increased,  Or can something else be prescribed, Possible Predisone   States she is still having trouble moving around.    Please advise

## 2020-04-15 ENCOUNTER — HOSPITAL ENCOUNTER (OUTPATIENT)
Dept: GENERAL RADIOLOGY | Age: 55
Discharge: HOME | End: 2020-04-15
Attending: FAMILY MEDICINE

## 2020-04-15 ENCOUNTER — TELEPHONE (OUTPATIENT)
Dept: FAMILY MEDICINE CLINIC | Facility: CLINIC | Age: 55
End: 2020-04-15

## 2020-04-15 DIAGNOSIS — M25.552 LEFT HIP PAIN: Primary | ICD-10-CM

## 2020-04-15 DIAGNOSIS — M25.552 LEFT HIP PAIN: ICD-10-CM

## 2020-04-15 DIAGNOSIS — M25.562 ACUTE PAIN OF LEFT KNEE: ICD-10-CM

## 2020-04-15 PROCEDURE — 73560 X-RAY EXAM OF KNEE 1 OR 2: CPT | Performed by: FAMILY MEDICINE

## 2020-04-15 PROCEDURE — 73502 X-RAY EXAM HIP UNI 2-3 VIEWS: CPT | Performed by: FAMILY MEDICINE

## 2020-04-15 RX ORDER — MELOXICAM 7.5 MG/1
7.5 TABLET ORAL 2 TIMES DAILY PRN
Qty: 30 TABLET | Refills: 1 | Status: SHIPPED | OUTPATIENT
Start: 2020-04-15 | End: 2020-05-05

## 2020-04-15 NOTE — TELEPHONE ENCOUNTER
Spoke to patient. Feels like pain is getting worse in left leg. Pain is concentrated in groin area but travels down legs when she tries to walk. Pain is burning/stabbing when it travels down leg.      On easter- noticed her thigh area near groin was s

## 2020-04-15 NOTE — TELEPHONE ENCOUNTER
Spoke to patient. Patient advised. Verbalized understanding. Provided number for central scheduling. Patient will call not to schedule xray. No pain on right side (right hip or knee).

## 2020-04-15 NOTE — TELEPHONE ENCOUNTER
We will get xray of her left hip and knee area to make sure we are not missing any arthritis or fracture. I will put order. Does she has pain on right hip or knee area? I will also send meloxicam strongpain meds to pharmacy.  Do not take ibuprofen with santana

## 2020-04-16 ENCOUNTER — TELEPHONE (OUTPATIENT)
Dept: FAMILY MEDICINE CLINIC | Facility: CLINIC | Age: 55
End: 2020-04-16

## 2020-04-16 NOTE — TELEPHONE ENCOUNTER
Patient advised. Verbalized understanding. Provided info for Dr. Carmita Silverman. Patient states her entire left leg feels numb/tingling since she woke up this morning. Denies leg looking red or swollen. Leg does not feel hot or cold.   She is unable to bend k

## 2020-04-16 NOTE — TELEPHONE ENCOUNTER
----- Message from Angelita Tilley MD sent at 4/16/2020 10:01 AM CDT -----  Please call patient and inform X-ray shows arthritis with no fracture. She will need to be referred to orthopedic surgeon.  I will put referral. Until she is seen continue ice or he

## 2020-04-16 NOTE — TELEPHONE ENCOUNTER
Spoke with pt. Able to bear weight. No weakness. No redness or swelling. Painful to bend knee and hip - ongoing. Advised to call and make appt with ortho for next avail appt. Call Dr. Judith Vallejo with update next week.

## 2020-04-21 ENCOUNTER — TELEPHONE (OUTPATIENT)
Dept: FAMILY MEDICINE CLINIC | Facility: CLINIC | Age: 55
End: 2020-04-21

## 2020-04-21 NOTE — TELEPHONE ENCOUNTER
Patient advised - patient also wanted to confirm hip has arthritis  X-ray results re-confirmed with patient.

## 2020-04-21 NOTE — TELEPHONE ENCOUNTER
Pt was dx with arthritis in her knee, states she has been able to walk more and some of the otc medications are working. Wants to know if she has arthritis in both knees or just one.   Please advise

## 2020-06-03 ENCOUNTER — OFFICE VISIT (OUTPATIENT)
Dept: ORTHOPEDICS CLINIC | Facility: CLINIC | Age: 55
End: 2020-06-03

## 2020-06-03 VITALS
HEART RATE: 82 BPM | HEIGHT: 63 IN | BODY MASS INDEX: 28.53 KG/M2 | RESPIRATION RATE: 16 BRPM | OXYGEN SATURATION: 98 % | WEIGHT: 161 LBS

## 2020-06-03 DIAGNOSIS — M54.10 RADICULAR PAIN OF LEFT LOWER EXTREMITY: Primary | ICD-10-CM

## 2020-06-03 PROCEDURE — 99203 OFFICE O/P NEW LOW 30 MIN: CPT | Performed by: ORTHOPAEDIC SURGERY

## 2020-06-03 NOTE — H&P
[facility] New Patient Note    CC: Patient presents with:  Consult: left hip/knee pain x 4/1/2020. pain with increased ambulation <PM.  knee- anterior/lower leg/numbness/tingling. wearing support stockings.  ice/elevate.   some swelling.  hip-latera NUTRITIONAL SUPPLEMENT + OR) Take by mouth. Poncho     • Nutritional Supplements (ADULT NUTRITIONAL SUPPLEMENT + OR) Take by mouth. Amberen     • pregabalin 50 MG Oral Cap Take 50 mg by mouth 3 (three) times daily.      • triamcinolone acetonide 0.025 % Exte joint space, no significant degenerative changes about the hip or acute osseous abnormalities. X-rays of the left knee personally reviewed and radiology report read. These are nonweightbearing AP and lateral images.   Demonstrates maintained joint space o placed. Patient understands and agrees to this plan. The above note was creating using Dragon speech recognition technology. Please excuse any typos.     MD Pam [facility]

## 2020-10-30 ENCOUNTER — TELEPHONE (OUTPATIENT)
Dept: FAMILY MEDICINE CLINIC | Facility: CLINIC | Age: 55
End: 2020-10-30

## 2020-10-30 RX ORDER — MELOXICAM 15 MG/1
15 TABLET ORAL DAILY
Qty: 30 TABLET | Refills: 0 | Status: SHIPPED | OUTPATIENT
Start: 2020-10-30 | End: 2020-11-29

## 2020-10-30 RX ORDER — TRIAMCINOLONE ACETONIDE 0.25 MG/G
1 CREAM TOPICAL 2 TIMES DAILY
Qty: 15 G | Refills: 0 | Status: SHIPPED | OUTPATIENT
Start: 2020-10-30 | End: 2021-05-04

## 2020-10-30 NOTE — TELEPHONE ENCOUNTER
Pt has a rash on her eye and her tendinitis is causing her a lot of pain.  Dr Ospina Just has her on a muscle relaxer but needs something stronger   Please advise

## 2020-10-30 NOTE — TELEPHONE ENCOUNTER
Rash started one week ago  Patient has a rash on left upper eye lid  Red, scab like, dry, skin flakes off  Denies drainage  Rash is itchy  Patient denies fever    Pain flare up started 2 weeks ago  Patient saw Dr. Sidney Cadena for this in the past  Pain starts

## 2020-10-30 NOTE — TELEPHONE ENCOUNTER
Steroid cream refilled for eyelid issue. If persisting, she will need to follow up. Increase dose of Meloxicam and sent to pharmacy. If pain continues or worsens, needs to be seen. Thanks!

## 2021-02-09 ENCOUNTER — TELEPHONE (OUTPATIENT)
Dept: FAMILY MEDICINE CLINIC | Facility: CLINIC | Age: 56
End: 2021-02-09

## 2021-04-29 ENCOUNTER — LAB ENCOUNTER (OUTPATIENT)
Dept: LAB | Age: 56
End: 2021-04-29
Attending: Other

## 2021-04-29 DIAGNOSIS — Z79.890 NEED FOR PROPHYLACTIC HORMONE REPLACEMENT THERAPY (POSTMENOPAUSAL): Primary | ICD-10-CM

## 2021-04-29 DIAGNOSIS — E78.00 PURE HYPERCHOLESTEROLEMIA: ICD-10-CM

## 2021-04-29 PROCEDURE — 80061 LIPID PANEL: CPT

## 2021-04-29 PROCEDURE — 36415 COLL VENOUS BLD VENIPUNCTURE: CPT

## 2021-05-04 ENCOUNTER — OFFICE VISIT (OUTPATIENT)
Dept: FAMILY MEDICINE CLINIC | Facility: CLINIC | Age: 56
End: 2021-05-04

## 2021-05-04 VITALS
DIASTOLIC BLOOD PRESSURE: 70 MMHG | TEMPERATURE: 98 F | BODY MASS INDEX: 29.45 KG/M2 | RESPIRATION RATE: 16 BRPM | HEART RATE: 81 BPM | WEIGHT: 158 LBS | HEIGHT: 61.5 IN | SYSTOLIC BLOOD PRESSURE: 108 MMHG | OXYGEN SATURATION: 98 %

## 2021-05-04 DIAGNOSIS — R35.0 FREQUENCY OF URINATION: Primary | ICD-10-CM

## 2021-05-04 PROCEDURE — 3078F DIAST BP <80 MM HG: CPT | Performed by: FAMILY MEDICINE

## 2021-05-04 PROCEDURE — 87086 URINE CULTURE/COLONY COUNT: CPT | Performed by: FAMILY MEDICINE

## 2021-05-04 PROCEDURE — 81003 URINALYSIS AUTO W/O SCOPE: CPT | Performed by: FAMILY MEDICINE

## 2021-05-04 PROCEDURE — 3074F SYST BP LT 130 MM HG: CPT | Performed by: FAMILY MEDICINE

## 2021-05-04 PROCEDURE — 3008F BODY MASS INDEX DOCD: CPT | Performed by: FAMILY MEDICINE

## 2021-05-04 PROCEDURE — 99213 OFFICE O/P EST LOW 20 MIN: CPT | Performed by: FAMILY MEDICINE

## 2021-05-04 RX ORDER — ATORVASTATIN CALCIUM 40 MG/1
40 TABLET, FILM COATED ORAL DAILY
COMMUNITY
Start: 2021-01-05 | End: 2021-09-27

## 2021-05-04 NOTE — PROGRESS NOTES
Juanjo Mack is a 54year old female. HPI:   Patient presents with symptoms of UTI. C/O urinary frequency since 2 months. Multiple times including 3-4 times at night.   Dysuria: No  Urinary Frequency: No  Urinary Urgency: No  Hematuria: No  Fever/Chi

## 2021-05-10 ENCOUNTER — TELEPHONE (OUTPATIENT)
Dept: FAMILY MEDICINE CLINIC | Facility: CLINIC | Age: 56
End: 2021-05-10

## 2021-05-12 ENCOUNTER — OFFICE VISIT (OUTPATIENT)
Dept: OBGYN CLINIC | Facility: CLINIC | Age: 56
End: 2021-05-12

## 2021-05-12 VITALS — SYSTOLIC BLOOD PRESSURE: 118 MMHG | BODY MASS INDEX: 30 KG/M2 | WEIGHT: 161.63 LBS | DIASTOLIC BLOOD PRESSURE: 76 MMHG

## 2021-05-12 DIAGNOSIS — B96.89 BV (BACTERIAL VAGINOSIS): ICD-10-CM

## 2021-05-12 DIAGNOSIS — N76.0 BV (BACTERIAL VAGINOSIS): ICD-10-CM

## 2021-05-12 DIAGNOSIS — L29.2 VULVAR ITCHING: Primary | ICD-10-CM

## 2021-05-12 PROCEDURE — 3074F SYST BP LT 130 MM HG: CPT | Performed by: OBSTETRICS & GYNECOLOGY

## 2021-05-12 PROCEDURE — 87480 CANDIDA DNA DIR PROBE: CPT | Performed by: OBSTETRICS & GYNECOLOGY

## 2021-05-12 PROCEDURE — 3078F DIAST BP <80 MM HG: CPT | Performed by: OBSTETRICS & GYNECOLOGY

## 2021-05-12 PROCEDURE — 87510 GARDNER VAG DNA DIR PROBE: CPT | Performed by: OBSTETRICS & GYNECOLOGY

## 2021-05-12 PROCEDURE — 87660 TRICHOMONAS VAGIN DIR PROBE: CPT | Performed by: OBSTETRICS & GYNECOLOGY

## 2021-05-12 PROCEDURE — 99214 OFFICE O/P EST MOD 30 MIN: CPT | Performed by: OBSTETRICS & GYNECOLOGY

## 2021-05-12 RX ORDER — NYSTATIN AND TRIAMCINOLONE ACETONIDE 100000; 1 [USP'U]/G; MG/G
1 OINTMENT TOPICAL 2 TIMES DAILY
Qty: 30 G | Refills: 0 | Status: SHIPPED | OUTPATIENT
Start: 2021-05-12 | End: 2021-09-27 | Stop reason: ALTCHOICE

## 2021-05-12 RX ORDER — METRONIDAZOLE 500 MG/1
500 TABLET ORAL 2 TIMES DAILY
Qty: 14 TABLET | Refills: 0 | Status: SHIPPED | OUTPATIENT
Start: 2021-05-12 | End: 2021-09-27 | Stop reason: ALTCHOICE

## 2021-05-12 NOTE — PROGRESS NOTES
[facility]  Obstetrics and Gynecology  Follow Up Progress Note    Subjective:     Sumaya Licona is a 54year old  female who was last seen in office 10/2018 and presents with c/o vulvar pruritis.  The patient reports vulvar rash for past AAO.NAD.   CVS exam: normal peripheral perfusion  Chest: non-labored breathing, no tachypnea   Abdominal exam: soft, nontender, nondistended  Pelvic exam:   MONS PUBIS:  normal appearing  VULVA: Normal bilateral vulva with small areas of erythema along the

## 2021-09-27 ENCOUNTER — OFFICE VISIT (OUTPATIENT)
Dept: FAMILY MEDICINE CLINIC | Facility: CLINIC | Age: 56
End: 2021-09-27

## 2021-09-27 VITALS
WEIGHT: 162 LBS | RESPIRATION RATE: 18 BRPM | SYSTOLIC BLOOD PRESSURE: 110 MMHG | OXYGEN SATURATION: 98 % | HEIGHT: 61 IN | BODY MASS INDEX: 30.58 KG/M2 | DIASTOLIC BLOOD PRESSURE: 80 MMHG | TEMPERATURE: 98 F | HEART RATE: 75 BPM

## 2021-09-27 DIAGNOSIS — R22.41 LUMP OF SKIN OF RIGHT LOWER EXTREMITY: Primary | ICD-10-CM

## 2021-09-27 DIAGNOSIS — M79.89 LEG SWELLING: ICD-10-CM

## 2021-09-27 PROCEDURE — 99213 OFFICE O/P EST LOW 20 MIN: CPT | Performed by: FAMILY MEDICINE

## 2021-09-27 PROCEDURE — 3008F BODY MASS INDEX DOCD: CPT | Performed by: FAMILY MEDICINE

## 2021-09-27 PROCEDURE — 3074F SYST BP LT 130 MM HG: CPT | Performed by: FAMILY MEDICINE

## 2021-09-27 PROCEDURE — 3079F DIAST BP 80-89 MM HG: CPT | Performed by: FAMILY MEDICINE

## 2021-09-27 NOTE — PROGRESS NOTES
Patient presents with:  Lump: lump right leg. leg is tender . HPI:    Patient ID: Christianne Taylor is a 64year old female. HPI   Patient here for lump on right calf area sine notice 1month ago. No redness no swelling no pain no fever.  She does t follow-ups on file.

## 2021-09-29 ENCOUNTER — TELEPHONE (OUTPATIENT)
Dept: FAMILY MEDICINE CLINIC | Facility: CLINIC | Age: 56
End: 2021-09-29

## 2021-09-29 NOTE — TELEPHONE ENCOUNTER
[facility] calling. Anna the ultrasound tech states the preliminary result shows the doppler is negative. They do not perform reflux studies there but the pt wanted the doppler done anyway without the reflux study. FYI.   They should be faxing a

## 2021-09-30 ENCOUNTER — TELEPHONE (OUTPATIENT)
Dept: FAMILY MEDICINE CLINIC | Facility: CLINIC | Age: 56
End: 2021-09-30

## 2021-09-30 NOTE — TELEPHONE ENCOUNTER
Spoke with the pt she had the ultrasound done at [facility]- looking for the results    Advised that Dr. Severiano Duster will be back in the office tomorrow morning

## 2021-09-30 NOTE — TELEPHONE ENCOUNTER
Pt had US done yesterday, wants to know if the results are back and if Dr viewed them. Pt was told there were no blood clots wants to know if anything else was seen.    Also, states this was not a DVT test.    Pt is aware dr Maggy Ashby is not in the office

## 2021-10-27 ENCOUNTER — TELEPHONE (OUTPATIENT)
Dept: FAMILY MEDICINE CLINIC | Facility: CLINIC | Age: 56
End: 2021-10-27

## 2021-11-30 ENCOUNTER — TELEPHONE (OUTPATIENT)
Dept: FAMILY MEDICINE CLINIC | Facility: CLINIC | Age: 56
End: 2021-11-30

## 2022-10-10 ENCOUNTER — TELEPHONE (OUTPATIENT)
Dept: FAMILY MEDICINE CLINIC | Facility: CLINIC | Age: 57
End: 2022-10-10

## 2022-10-10 NOTE — TELEPHONE ENCOUNTER
[facility] [employer] message sent to patient to let her know she is due for a physical. Patient is also due for a mammogram.

## 2022-11-26 ENCOUNTER — PATIENT OUTREACH (OUTPATIENT)
Dept: FAMILY MEDICINE CLINIC | Facility: CLINIC | Age: 57
End: 2022-11-26

## 2023-08-01 ENCOUNTER — TELEPHONE (OUTPATIENT)
Dept: FAMILY MEDICINE CLINIC | Facility: CLINIC | Age: 58
End: 2023-08-01

## 2023-09-18 ENCOUNTER — TELEPHONE (OUTPATIENT)
Dept: FAMILY MEDICINE CLINIC | Facility: CLINIC | Age: 58
End: 2023-09-18

## 2023-10-18 ENCOUNTER — PATIENT OUTREACH (OUTPATIENT)
Dept: FAMILY MEDICINE CLINIC | Facility: CLINIC | Age: 58
End: 2023-10-18

## 2023-12-19 ENCOUNTER — PATIENT OUTREACH (OUTPATIENT)
Dept: CASE MANAGEMENT | Age: 58
End: 2023-12-19

## 2023-12-19 NOTE — PROCEDURES
The office order for PCP removal request is Approved and finalized on December 19, 2023.     Thanks,  [facility] [employer]

## (undated) NOTE — LETTER
08/31/18        Samy Hyatt      Dear [facility],    [id number] [facility] records indicate that you have outstanding testing that was ordered for you and has not yet been completed:               MRI KNEE, RIGHT ([id number]=[id number])  To provide

## (undated) NOTE — LETTER
11/30/21      [place] [address]           Dear [facility] records indicate that you have outstanding lab work and or testing that was ordered for you and has not yet been completed:  Lab Frequency Ne